# Patient Record
Sex: MALE | Race: OTHER
[De-identification: names, ages, dates, MRNs, and addresses within clinical notes are randomized per-mention and may not be internally consistent; named-entity substitution may affect disease eponyms.]

---

## 2020-11-06 ENCOUNTER — HOSPITAL ENCOUNTER (OUTPATIENT)
Dept: HOSPITAL 53 - M RAD | Age: 40
End: 2020-11-06
Payer: COMMERCIAL

## 2020-11-06 DIAGNOSIS — R91.8: Primary | ICD-10-CM

## 2020-11-06 NOTE — REP
INDICATION:

HELEN INCREASED PERIRECTAL / HILAR NODULE DENSITY/FILE ROOM.



COMPARISON:

None an outside chest report suggests subtle nodular density in the right hilar region

in a patient with 29 pack year smoking history.



TECHNIQUE:

Noncontrast chest CT with coronal and sagittal reconstructions.



FINDINGS:

There are no lung masses.  There is no pleural effusion, pleural thickening, calcified

pleural plaques or masses identified.  The lung fields are well inflated.  There is

some minor dependent atelectatic change posteriorly in the right mid to lower lung

zone.  There is a 3 mm nodule in the right apex that is noncalcified and best seen on

image 14. Heart size is not enlarged the aorta is without aneurysm there is some

prominence of pulmonary arteries at the marily without the perihilar nodules,

infiltrate, mass, definite adenopathy or increased interstitial markings.  Some sub cm

normal-sized mediastinal nodes are present the all less than a cm.  No axillary or

supravalvular mass.  Bone windows show the sternum, manubrium, medial clavicles,

visualized portions of scapula, humeral heads and ribs all intact.  Spine included was

unremarkable.



IMPRESSION:

1. No hilar or perihilar mass, nodule or infiltrate.  No definite adenopathy.  Exam be

less sensitive for adenopathy with the absence of IV contrast but pulmonary arteries

are prominent and account for the radiographic finding.

2. 3 mm noncalcified nodule left apex.  No other significant finding.

3. Patient with significant smoking history, follow-up recommendation per the

[employer] would be 1 year CT examination.





<Electronically signed by Samuel Lucas > 11/06/20 [id number]